# Patient Record
Sex: MALE | Race: WHITE | URBAN - NONMETROPOLITAN AREA
[De-identification: names, ages, dates, MRNs, and addresses within clinical notes are randomized per-mention and may not be internally consistent; named-entity substitution may affect disease eponyms.]

---

## 2017-12-13 ENCOUNTER — HOSPITAL ENCOUNTER (OUTPATIENT)
Dept: PHYSICAL THERAPY | Age: 42
Discharge: HOME | End: 2017-12-31
Attending: FAMILY MEDICINE | Admitting: FAMILY MEDICINE

## 2017-12-13 ASSESSMENT — PAIN DESCRIPTION - DESCRIPTORS: DESCRIPTORS: BURNING;JABBING

## 2017-12-13 ASSESSMENT — PAIN SCALES - GENERAL: PAINLEVEL_OUTOF10: 6

## 2017-12-13 ASSESSMENT — PAIN DESCRIPTION - LOCATION: LOCATION: BACK;HIP

## 2017-12-13 ASSESSMENT — PAIN DESCRIPTION - PAIN TYPE: TYPE: CHRONIC PAIN

## 2018-01-01 ENCOUNTER — HOSPITAL ENCOUNTER (OUTPATIENT)
Dept: PHYSICAL THERAPY | Age: 43
Discharge: HOME | End: 2018-01-31
Attending: FAMILY MEDICINE | Admitting: FAMILY MEDICINE

## 2018-01-02 ENCOUNTER — HOSPITAL ENCOUNTER (OUTPATIENT)
Dept: PHYSICAL THERAPY | Age: 43
Discharge: HOME | End: 2018-01-02
Admitting: FAMILY MEDICINE

## 2018-01-02 NOTE — FLOWSHEET NOTE
(80-99% Impairment, Dep.)   [] CN      (100% Impairment, Tot Dep.)          Subjective: Patient reports he has a cold and did not take medications for pain due to this. He states that he feels more relief when he is allowed to twist and turn. \" I have dealt with this so long that I know how in need to stretch. \" He states that he had to get an Xray on Friday for his thoracic spine.       Key  B= Belt DB= Dumbells T= Theratube   H= Hydrotone N= Noodles W= Weights   P= Paddles S= Speedo equipment K= Kickboard     Exercises/Activities   Warm-up/Amb    Exercises      Slow forward  5' small dumbbell   HR/TR  20x HR    Slow sideways   5' small dumbbell  Marches  2'    Slow backwards  5' small dumbbell   Mini-squats  20x    Medium forward    4-way SLR  10x ea way ait    Medium sideways    Hip circles/fig 8   ccw/CW 60\" ea way B    Small shuffle    Hamstring curls      Jog    Knee extension      Braiding    Pelvic tilts      Bicycling    Scap squeezes          Shoulder flex/ext      Functional    Shoulder abd/add      Step    Shoulder H. abd/add      Lifting    Shoulder IR/ER      Hand to opp knee    Rowing      Push down squat    Bilateral pull down     UE PNF    Push/pull      LE PNF    Push downs      Wall push ups    Arm circles      SLS    Elbow flex/ext          Chin tuck      Stretching    UT shrugs/rolls      Gastroc/Soleus  1' B   Rocking horse      Hamstring  1' B        SKTC    Other      Piriformis    dangle  5'    Hip flexor  1' B  dangle hip abduction     Ladder pull    bicycle  2'    Pec stretch    gathering  30\" B     Post deltoid    shifting  30\" B     Accepting  30\"      Treatment Included:  [] Therapeutic Exercise   [] Instruction in HEP  [] Group   [] Therapeutic Activity      [] Neuromuscular Re-education [x] Aquatic   [] Gait             [] Manual  Other:  Time In/ Time Out:  1103/1204    Timed Code Treatment Minutes: 61aqua     Total Treatment Minutes:  61      Objective Findings:  Patient has a lot of difficulty understanding simple tasks. He needs a lot of demonstration and verbal cueing. aberrant movement patterns persist with a lot of excessive twisting \"stretching\"    Communication with other providers:    Education to Patient:    Adverse Reaction to Treatment:    Assessment:   Patient tolerated treatment well but had difficulty with form with exercise. Skilled PT services are warranted to address deficits to promote a higher quality of life. Monitoring of symptoms and possiblity of magnification.     patient did have a lot of difficulty understanding purpose of PT, anatomy, plan of care    Treatment/Activity Tolerance:  [x] Patient tolerated treatment well [] Patient limited by fatique  [] Patient limited by pain [] Patient limited by other medical complications  [] Other:     Prognosis: [] Good [] Fair  [] Poor    Patient Requires Follow-up: [] Yes  [] No    Plan:   [] Continue per plan of care [] Alter current plan (see comments)  [] Plan of care initiated [] Hold pending MD visit [] Discharge    See Weekly Progress Note:    [] Yes [] No Next due:        Electronically signed by:  Abilio George DPT 1/2/2018, 11:05 AM

## 2018-02-01 ENCOUNTER — HOSPITAL ENCOUNTER (OUTPATIENT)
Dept: PHYSICAL THERAPY | Age: 43
Discharge: HOME | End: 2018-02-13
Attending: FAMILY MEDICINE | Admitting: FAMILY MEDICINE